# Patient Record
Sex: FEMALE | Race: WHITE | NOT HISPANIC OR LATINO | Employment: OTHER | URBAN - METROPOLITAN AREA
[De-identification: names, ages, dates, MRNs, and addresses within clinical notes are randomized per-mention and may not be internally consistent; named-entity substitution may affect disease eponyms.]

---

## 2019-02-25 ENCOUNTER — TRANSCRIBE ORDERS (OUTPATIENT)
Dept: REGISTRATION | Age: 74
End: 2019-02-25

## 2019-02-25 ENCOUNTER — APPOINTMENT (OUTPATIENT)
Dept: LAB | Age: 74
End: 2019-02-25
Attending: INTERNAL MEDICINE
Payer: MEDICARE

## 2019-02-25 DIAGNOSIS — E03.8 OTHER SPECIFIED HYPOTHYROIDISM: Primary | ICD-10-CM

## 2019-02-25 DIAGNOSIS — M81.0 AGE-RELATED OSTEOPOROSIS WITHOUT CURRENT PATHOLOGICAL FRACTURE: ICD-10-CM

## 2019-02-25 DIAGNOSIS — I10 ESSENTIAL (PRIMARY) HYPERTENSION: ICD-10-CM

## 2019-02-25 DIAGNOSIS — D50.9 IRON DEFICIENCY ANEMIA: ICD-10-CM

## 2019-02-25 DIAGNOSIS — E06.3 AUTOIMMUNE THYROIDITIS: ICD-10-CM

## 2019-02-25 DIAGNOSIS — E78.5 HYPERLIPIDEMIA: Primary | ICD-10-CM

## 2019-02-25 DIAGNOSIS — E03.9 HYPOTHYROIDISM: ICD-10-CM

## 2019-02-25 DIAGNOSIS — E78.5 HYPERLIPIDEMIA: ICD-10-CM

## 2019-02-25 DIAGNOSIS — E03.8 OTHER SPECIFIED HYPOTHYROIDISM: ICD-10-CM

## 2019-02-25 LAB
ALBUMIN SERPL-MCNC: 3.6 G/DL (ref 3.4–5)
ALP SERPL-CCNC: 54 IU/L (ref 35–126)
ALT SERPL-CCNC: 28 IU/L (ref 11–54)
ANION GAP SERPL CALC-SCNC: 10 MEQ/L (ref 3–15)
AST SERPL-CCNC: 30 IU/L (ref 15–41)
BASOPHILS # BLD: 0.06 K/UL (ref 0.01–0.1)
BASOPHILS NFR BLD: 0.9 %
BILIRUB SERPL-MCNC: 1.2 MG/DL (ref 0.3–1.2)
BUN SERPL-MCNC: 15 MG/DL (ref 8–20)
CALCIUM SERPL-MCNC: 9.4 MG/DL (ref 8.9–10.3)
CHLORIDE SERPL-SCNC: 101 MEQ/L (ref 98–109)
CHOLEST SERPL-MCNC: 216 MG/DL
CO2 SERPL-SCNC: 30 MEQ/L (ref 22–32)
CREAT SERPL-MCNC: 0.8 MG/DL
DIFFERENTIAL METHOD BLD: NORMAL
EOSINOPHIL # BLD: 0.12 K/UL (ref 0.04–0.36)
EOSINOPHIL NFR BLD: 1.9 %
ERYTHROCYTE [DISTWIDTH] IN BLOOD BY AUTOMATED COUNT: 13.2 % (ref 11.7–14.4)
FERRITIN SERPL-MCNC: 42 NG/ML (ref 11–250)
GFR SERPL CREATININE-BSD FRML MDRD: >60 ML/MIN/1.73M*2
GLUCOSE SERPL-MCNC: 82 MG/DL (ref 70–99)
HCT VFR BLDCO AUTO: 38.5 %
HDLC SERPL-MCNC: 61 MG/DL
HDLC SERPL: 3.5 {RATIO}
HGB BLD-MCNC: 12.6 G/DL
IMM GRANULOCYTES # BLD AUTO: 0.02 K/UL (ref 0–0.08)
IMM GRANULOCYTES NFR BLD AUTO: 0.3 %
IRON SATN MFR SERPL: 40 % (ref 15–45)
IRON SERPL-MCNC: 144 UG/DL (ref 35–150)
LDLC SERPL CALC-MCNC: 123 MG/DL
LYMPHOCYTES # BLD: 1.92 K/UL (ref 1.2–3.5)
LYMPHOCYTES NFR BLD: 29.7 %
MCH RBC QN AUTO: 30.4 PG (ref 28–33.2)
MCHC RBC AUTO-ENTMCNC: 32.7 G/DL (ref 32.2–35.5)
MCV RBC AUTO: 93 FL (ref 83–98)
MONOCYTES # BLD: 0.52 K/UL (ref 0.28–0.8)
MONOCYTES NFR BLD: 8 %
NEUTROPHILS # BLD: 3.83 K/UL (ref 1.7–7)
NEUTS SEG NFR BLD: 59.2 %
NONHDLC SERPL-MCNC: 155 MG/DL
NRBC BLD-RTO: 0 %
PDW BLD AUTO: 10.2 FL (ref 9.4–12.3)
PLATELET # BLD AUTO: 329 K/UL
POTASSIUM SERPL-SCNC: 3.6 MEQ/L (ref 3.6–5.1)
PROT SERPL-MCNC: 6.1 G/DL (ref 6–8.2)
RBC # BLD AUTO: 4.14 M/UL (ref 3.93–5.22)
SODIUM SERPL-SCNC: 141 MEQ/L (ref 136–144)
T4 FREE SERPL-MCNC: 1.7 NG/DL (ref 0.58–1.64)
TIBC SERPL-MCNC: 360 UG/DL (ref 270–460)
TRIGL SERPL-MCNC: 161 MG/DL (ref 30–149)
TSH SERPL DL<=0.05 MIU/L-ACNC: 1.67 MIU/L (ref 0.34–5.6)
UIBC SERPL-MCNC: 216 UG/DL (ref 180–360)
WBC # BLD AUTO: 6.47 K/UL

## 2019-02-25 PROCEDURE — 83540 ASSAY OF IRON: CPT

## 2019-02-25 PROCEDURE — 82728 ASSAY OF FERRITIN: CPT

## 2019-02-25 PROCEDURE — 85025 COMPLETE CBC W/AUTO DIFF WBC: CPT

## 2019-02-25 PROCEDURE — 80053 COMPREHEN METABOLIC PANEL: CPT

## 2019-02-25 PROCEDURE — 84439 ASSAY OF FREE THYROXINE: CPT

## 2019-02-25 PROCEDURE — 36415 COLL VENOUS BLD VENIPUNCTURE: CPT

## 2019-02-25 PROCEDURE — 84443 ASSAY THYROID STIM HORMONE: CPT

## 2019-02-25 PROCEDURE — 80061 LIPID PANEL: CPT

## 2019-03-19 ENCOUNTER — APPOINTMENT (OUTPATIENT)
Dept: LAB | Age: 74
End: 2019-03-19
Attending: INTERNAL MEDICINE
Payer: MEDICARE

## 2019-03-19 ENCOUNTER — TRANSCRIBE ORDERS (OUTPATIENT)
Dept: LAB | Age: 74
End: 2019-03-19

## 2019-03-19 DIAGNOSIS — D50.9 IRON DEFICIENCY ANEMIA: ICD-10-CM

## 2019-03-19 DIAGNOSIS — R53.83 OTHER FATIGUE: ICD-10-CM

## 2019-03-19 DIAGNOSIS — D64.9 ANEMIA: ICD-10-CM

## 2019-03-19 DIAGNOSIS — D50.9 IRON DEFICIENCY ANEMIA: Primary | ICD-10-CM

## 2019-03-19 LAB
BASOPHILS # BLD: 0.06 K/UL (ref 0.01–0.1)
BASOPHILS NFR BLD: 0.9 %
DIFFERENTIAL METHOD BLD: NORMAL
EOSINOPHIL # BLD: 0.15 K/UL (ref 0.04–0.36)
EOSINOPHIL NFR BLD: 2.1 %
ERYTHROCYTE [DISTWIDTH] IN BLOOD BY AUTOMATED COUNT: 14.1 % (ref 11.7–14.4)
FERRITIN SERPL-MCNC: 25 NG/ML (ref 11–250)
HCT VFR BLDCO AUTO: 37.6 %
HGB BLD-MCNC: 12.5 G/DL
IMM GRANULOCYTES # BLD AUTO: 0.04 K/UL (ref 0–0.08)
IMM GRANULOCYTES NFR BLD AUTO: 0.6 %
IRON SATN MFR SERPL: 33 % (ref 15–45)
IRON SERPL-MCNC: 119 UG/DL (ref 35–150)
LYMPHOCYTES # BLD: 1.97 K/UL (ref 1.2–3.5)
LYMPHOCYTES NFR BLD: 28 %
MCH RBC QN AUTO: 31.4 PG (ref 28–33.2)
MCHC RBC AUTO-ENTMCNC: 33.2 G/DL (ref 32.2–35.5)
MCV RBC AUTO: 94.5 FL (ref 83–98)
MONOCYTES # BLD: 0.45 K/UL (ref 0.28–0.8)
MONOCYTES NFR BLD: 6.4 %
NEUTROPHILS # BLD: 4.37 K/UL (ref 1.7–7)
NEUTS SEG NFR BLD: 62 %
NRBC BLD-RTO: 0 %
PDW BLD AUTO: 10.9 FL (ref 9.4–12.3)
PLATELET # BLD AUTO: 267 K/UL
RBC # BLD AUTO: 3.98 M/UL (ref 3.93–5.22)
RETICS #: 0.11 M/UL (ref 0–0.12)
RETICS/RBC NFR: 2.67 % (ref 0.6–2.8)
TIBC SERPL-MCNC: 358 UG/DL (ref 270–460)
UIBC SERPL-MCNC: 239 UG/DL (ref 180–360)
WBC # BLD AUTO: 7.04 K/UL

## 2019-03-19 PROCEDURE — 83550 IRON BINDING TEST: CPT

## 2019-03-19 PROCEDURE — 85025 COMPLETE CBC W/AUTO DIFF WBC: CPT

## 2019-03-19 PROCEDURE — 85045 AUTOMATED RETICULOCYTE COUNT: CPT

## 2019-03-19 PROCEDURE — 36415 COLL VENOUS BLD VENIPUNCTURE: CPT

## 2019-03-19 PROCEDURE — 82728 ASSAY OF FERRITIN: CPT

## 2021-06-22 ENCOUNTER — OFFICE VISIT (OUTPATIENT)
Dept: ENDOCRINOLOGY | Facility: CLINIC | Age: 76
End: 2021-06-22
Payer: MEDICARE

## 2021-06-22 VITALS
HEART RATE: 66 BPM | DIASTOLIC BLOOD PRESSURE: 98 MMHG | OXYGEN SATURATION: 99 % | BODY MASS INDEX: 26.05 KG/M2 | RESPIRATION RATE: 18 BRPM | WEIGHT: 147 LBS | SYSTOLIC BLOOD PRESSURE: 162 MMHG | HEIGHT: 63 IN

## 2021-06-22 DIAGNOSIS — M81.0 AGE-RELATED OSTEOPOROSIS WITHOUT CURRENT PATHOLOGICAL FRACTURE: Primary | ICD-10-CM

## 2021-06-22 DIAGNOSIS — E55.9 VITAMIN D DEFICIENCY: ICD-10-CM

## 2021-06-22 DIAGNOSIS — E06.3 HYPOTHYROIDISM DUE TO HASHIMOTO'S THYROIDITIS: ICD-10-CM

## 2021-06-22 PROCEDURE — 99205 OFFICE O/P NEW HI 60 MIN: CPT | Performed by: INTERNAL MEDICINE

## 2021-06-22 RX ORDER — ELECTROLYTES/DEXTROSE
SOLUTION, ORAL ORAL DAILY
COMMUNITY

## 2021-06-22 RX ORDER — VITAMIN E (DL,TOCOPHERYL ACET) 180 MG
CAPSULE ORAL
COMMUNITY

## 2021-06-22 RX ORDER — FLUTICASONE PROPIONATE 220 UG/1
AEROSOL, METERED RESPIRATORY (INHALATION)
COMMUNITY

## 2021-06-22 RX ORDER — DOCUSATE SODIUM 100 MG/1
100 CAPSULE, LIQUID FILLED ORAL 2 TIMES DAILY
COMMUNITY

## 2021-06-22 RX ORDER — LEVOTHYROXINE SODIUM 100 UG/1
100 TABLET ORAL
COMMUNITY

## 2021-06-22 RX ORDER — LOSARTAN POTASSIUM AND HYDROCHLOROTHIAZIDE 25; 100 MG/1; MG/1
1 TABLET ORAL DAILY
COMMUNITY
End: 2021-06-22

## 2021-06-22 RX ORDER — FAMOTIDINE 10 MG/1
10 TABLET ORAL NIGHTLY PRN
COMMUNITY

## 2021-06-22 RX ORDER — MONTELUKAST SODIUM 10 MG/1
TABLET ORAL
COMMUNITY

## 2021-06-22 RX ORDER — AA/PROT/LYSINE/METHIO/VIT C/B6 50-12.5 MG
TABLET ORAL
COMMUNITY

## 2021-06-22 RX ORDER — ACETAMINOPHEN 500 MG
2000 TABLET ORAL DAILY
Qty: 90 CAPSULE | Refills: 3 | COMMUNITY
Start: 2021-06-22

## 2021-06-22 RX ORDER — ALBUTEROL SULFATE 90 UG/1
INHALANT RESPIRATORY (INHALATION)
COMMUNITY

## 2021-06-22 RX ORDER — PRAVASTATIN SODIUM 40 MG/1
40 TABLET ORAL
COMMUNITY
Start: 2021-03-28

## 2021-06-22 RX ORDER — LOSARTAN POTASSIUM 50 MG/1
100 TABLET ORAL DAILY
COMMUNITY

## 2021-06-22 RX ORDER — SODIUM FLUORIDE 6.1 MG/ML
GEL, DENTIFRICE DENTAL
COMMUNITY
Start: 2021-04-09

## 2021-06-22 RX ORDER — AMLODIPINE BESYLATE 5 MG/1
2.5 TABLET ORAL DAILY
COMMUNITY

## 2021-06-22 RX ORDER — DEXLANSOPRAZOLE 60 MG/1
CAPSULE, DELAYED RELEASE ORAL
COMMUNITY

## 2021-06-22 RX ORDER — CHOLECALCIFEROL (VITAMIN D3) 100000/G
2000 POWDER (GRAM) MISCELLANEOUS
COMMUNITY
End: 2021-06-22

## 2021-06-22 ASSESSMENT — ENCOUNTER SYMPTOMS
HEMATOLOGIC/LYMPHATIC NEGATIVE: 1
ENDOCRINE COMMENTS: AS PER HPI
EYES NEGATIVE: 1
GASTROINTESTINAL NEGATIVE: 1
PSYCHIATRIC NEGATIVE: 1
ALLERGIC/IMMUNOLOGIC NEGATIVE: 1
CARDIOVASCULAR NEGATIVE: 1
CONSTITUTIONAL NEGATIVE: 1
MUSCULOSKELETAL NEGATIVE: 1
RESPIRATORY NEGATIVE: 1
NEUROLOGICAL NEGATIVE: 1

## 2021-06-22 NOTE — Clinical Note
Pt is due for prolia in June. Going for labs now, can you please work on insurance coverage, so she can be scheduled to receive prolia as long as labs are good next Tuesday? Thanks.
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
## 2021-06-22 NOTE — ASSESSMENT & PLAN NOTE
-reviewed DXA scans/ prior records  -pt's last DXA 6/2020; stable Tscore; LS -1.6, hip -1.6, fem neck -3.3; due for DXA 6/2022  -previous OP w/u: PTH normal, 24 hour urine calcium not elevated, celiac screen negative. Overtreated for hypothyroidism in the past. No longer on raloxifene (not for osteoporosis). On denosumab since 6/2017. Previously completed fosmax [id number]  -at this time, due for prolia- last dose 12/2020- will check labs now; as long as labs wnl; proceed with prolia  -reviewed DXA, risk factors for OP, reviewed definition of OP, indication for treatment of OP, lifestyle tx options including calcium intake, vitamin D, weight bearing activity, and avoiding falls, pharmacologic therapy options, associated risks/benefits/ side effects of OP treatment options, treatment duration, failure of therapy, monitoring while on OP medications  -- We discussed that if on prolia, the medication should be dosed every 6 months, and delaying the medication administration increases the risk of bone loss and fracture.  - This medication carries the adverse risk of osteonecrosis of the jaw and atypical femoral fractures. Patient will need to continue with prolia long term.  - Denosumab suppresses bone remodeling, but there are few data on the long-term consequences with regard to adverse outcomes, such as ONJ, atypical fractures, and delayed fracture healing. ONJ and atypical fractures have been reported in patients taking denosumab for osteoporosis  -cont ca/ vitamin D/ weight bearing exercise/ avoid falls  -Return in about 6 months (around 12/22/2021).with labs

## 2021-06-22 NOTE — ASSESSMENT & PLAN NOTE
-clinically euthyroid/ check TSH now  -cont synthroid 100mcg - 1 tablet 6 days/week, 1.5 tablets 1 day/week  -pending TSH; will send synthroid refills  Return in about 6 months (around 12/22/2021).
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
## 2021-06-23 ENCOUNTER — TELEPHONE (OUTPATIENT)
Dept: ENDOCRINOLOGY | Facility: CLINIC | Age: 76
End: 2021-06-23

## 2021-06-23 NOTE — TELEPHONE ENCOUNTER
----- Message from Radha Snyder MD sent at 6/22/2021 11:43 AM EDT -----  Pt is due for prolia in June. Going for labs now, can you please work on insurance coverage, so she can be scheduled to receive prolia as long as labs are good next Tuesday? Thanks.

## 2021-06-25 LAB
25(OH)D3 SERPL-MCNC: 41 NG/ML (ref 30–100)
ALBUMIN SERPL-MCNC: 4 G/DL (ref 3.6–5.1)
ALBUMIN/GLOB SERPL: 1.9 (CALC) (ref 1–2.5)
ALP SERPL-CCNC: 57 U/L (ref 37–153)
ALT SERPL-CCNC: 15 U/L (ref 6–29)
AST SERPL-CCNC: 17 U/L (ref 10–35)
BILIRUB SERPL-MCNC: 1 MG/DL (ref 0.2–1.2)
BUN SERPL-MCNC: 15 MG/DL (ref 7–25)
BUN/CREAT SERPL: NORMAL (CALC) (ref 6–22)
CALCIUM SERPL-MCNC: 9.1 MG/DL (ref 8.6–10.4)
CHLORIDE SERPL-SCNC: 107 MMOL/L (ref 98–110)
CO2 SERPL-SCNC: 28 MMOL/L (ref 20–32)
CREAT SERPL-MCNC: 0.75 MG/DL (ref 0.6–0.93)
GLOBULIN SER CALC-MCNC: 2.1 G/DL (CALC) (ref 1.9–3.7)
GLUCOSE SERPL-MCNC: 83 MG/DL (ref 65–139)
POTASSIUM SERPL-SCNC: 4.1 MMOL/L (ref 3.5–5.3)
PROT SERPL-MCNC: 6.1 G/DL (ref 6.1–8.1)
QUEST EGFR AFRICAN AMERICAN: 90 ML/MIN/1.73M2
QUEST EGFR NON-AFR. AMERICAN: 78 ML/MIN/1.73M2
SODIUM SERPL-SCNC: 142 MMOL/L (ref 135–146)
TSH SERPL-ACNC: 2.3 MIU/L (ref 0.4–4.5)

## 2021-06-25 RX ORDER — LEVOTHYROXINE SODIUM 100 UG/1
100 TABLET ORAL
Qty: 90 TABLET | Refills: 1 | Status: CANCELLED | OUTPATIENT
Start: 2021-06-25 | End: 2021-09-23

## 2021-06-25 NOTE — TELEPHONE ENCOUNTER
Prolia benefit investigation is complete and covered at 100% for buy and bill.  I will rerun for RX coverage and see how that turns out.

## 2021-06-25 NOTE — TELEPHONE ENCOUNTER
----- Message from Radha Snyder MD sent at 6/25/2021  3:10 PM EDT -----  Please update with pharmacy. Thanks.

## 2021-06-25 NOTE — TELEPHONE ENCOUNTER
----- Message from Radha Snyder MD sent at 6/25/2021  2:43 PM EDT -----  Regarding: prolia injections at [facility]  Hi there:     This is a new [facility] pt who is due for prolia. Are we able to get her squared away for Prolia injection at [facility]?     Jeana- can you please get the ball rolling for her insurance? I'm assuming her picking up from pharmacy and bringing to office would be best.     Verito- we'll have to schedule her for a diagnostic visit, so she can get the injection.     Elizabeth- any issues on your end?     Thanks,   TK   ----- Message -----  From: Interface, [facility] Lab Results In  Sent: 6/25/2021   2:45 AM EDT  To: Radha Snyder MD

## 2021-06-25 NOTE — TELEPHONE ENCOUNTER
Pt called office to s/u an appt in [facility] for prolia inject.  Informed, prolia is not given in [facility] only in [facility].  Nurse spoke w/ PSR Jeana - who stated that she submitted a form into pt's insurance about the prolia.  When she hears approval from that she will call pt to s/u appt for prolia injection.  Pt stated understanding

## 2021-06-29 NOTE — TELEPHONE ENCOUNTER
----- Message from Radha Snyder MD sent at 6/29/2021  8:15 AM EDT -----  Do we have a pharmacy for this patient yet?

## 2021-06-29 NOTE — TELEPHONE ENCOUNTER
Called pt again this morning to obtain pharmacy info to get rx coverage to submit to [employer] for benefit investigation for Prolia through ph benefits. Pt states that she is not providing pharmacy info as she was told she could have Prolia at \[facility]\"" this week with no issue.She was very frustrated that the Prolia was not already approved and I informed the pt that since she is having this done at \[facility]\"" we must go through her ph benefits, not Medicare.  Pt states she does not want to go forward treating with this practice.  Told pt I would let Dr Snyder know.

## 2021-12-07 ENCOUNTER — TELEPHONE (OUTPATIENT)
Dept: ENDOCRINOLOGY | Facility: CLINIC | Age: 76
End: 2021-12-07
Payer: MEDICARE

## 2021-12-07 NOTE — TELEPHONE ENCOUNTER
Spoke with pt to remind of upcoming appt.  And, she stated she feels like she had been deceived.  She signed up to come here for Prolia, then was told she would have to go elsewhere for Prolia, so she is cancelling.

## 2024-05-03 ENCOUNTER — DOCUMENTATION (OUTPATIENT)
Dept: GASTROENTEROLOGY | Facility: CLINIC | Age: 79
End: 2024-05-03

## 2024-05-03 RX ORDER — LEVOCETIRIZINE DIHYDROCHLORIDE 5 MG/1
5 TABLET, FILM COATED ORAL EVERY EVENING
COMMUNITY

## 2024-05-03 RX ORDER — FLUTICASONE PROPIONATE 50 MCG
1 SPRAY, SUSPENSION (ML) NASAL DAILY
COMMUNITY

## 2024-05-03 RX ORDER — LIDOCAINE HYDROCHLORIDE 20 MG/ML
1 JELLY TOPICAL DAILY PRN
COMMUNITY

## 2024-05-03 RX ORDER — DEXLANSOPRAZOLE 60 MG/1
60 CAPSULE, DELAYED RELEASE ORAL DAILY
COMMUNITY

## 2024-05-03 RX ORDER — FLUTICASONE PROPIONATE 220 UG/1
2 AEROSOL, METERED RESPIRATORY (INHALATION) 2 TIMES DAILY
COMMUNITY

## 2024-05-03 RX ORDER — EPINEPHRINE 0.3 MG/.3ML
0.3 INJECTION SUBCUTANEOUS ONCE
COMMUNITY

## 2024-05-03 RX ORDER — LEVOTHYROXINE SODIUM 0.1 MG/1
100 TABLET ORAL DAILY
COMMUNITY

## 2024-05-03 RX ORDER — MONTELUKAST SODIUM 10 MG/1
10 TABLET ORAL
COMMUNITY

## 2024-05-03 RX ORDER — TRIAMCINOLONE ACETONIDE 1 MG/G
OINTMENT TOPICAL 2 TIMES DAILY
COMMUNITY

## 2024-05-03 RX ORDER — DENOSUMAB 60 MG/ML
60 INJECTION SUBCUTANEOUS ONCE
COMMUNITY

## 2024-05-03 RX ORDER — AMLODIPINE BESYLATE 5 MG/1
5 TABLET ORAL DAILY
COMMUNITY

## 2024-05-03 RX ORDER — PRAVASTATIN SODIUM 80 MG/1
80 TABLET ORAL DAILY
COMMUNITY

## 2024-05-03 RX ORDER — HYDROXYZINE HYDROCHLORIDE 25 MG/1
25 TABLET, FILM COATED ORAL EVERY 6 HOURS PRN
COMMUNITY

## 2024-05-03 RX ORDER — LOSARTAN POTASSIUM 100 MG/1
25 TABLET ORAL DAILY
COMMUNITY

## 2024-05-03 RX ORDER — ALBUTEROL SULFATE 90 UG/1
2 AEROSOL, METERED RESPIRATORY (INHALATION) EVERY 6 HOURS PRN
COMMUNITY

## 2024-05-03 RX ORDER — SODIUM FLUORIDE AND POTASSIUM NITRATE 5.8; 57.5 MG/ML; MG/ML
GEL, DENTIFRICE DENTAL
COMMUNITY

## 2024-05-03 RX ORDER — VIT C/B6/B5/MAGNESIUM/HERB 173 50-5-6-5MG
CAPSULE ORAL
COMMUNITY

## 2024-05-03 RX ORDER — FAMOTIDINE 20 MG/1
20 TABLET, FILM COATED ORAL 2 TIMES DAILY
COMMUNITY

## 2024-05-13 ENCOUNTER — CONSULT (OUTPATIENT)
Age: 79
End: 2024-05-13
Payer: MEDICARE

## 2024-05-13 VITALS
HEIGHT: 62 IN | BODY MASS INDEX: 27.75 KG/M2 | WEIGHT: 150.8 LBS | SYSTOLIC BLOOD PRESSURE: 118 MMHG | DIASTOLIC BLOOD PRESSURE: 74 MMHG

## 2024-05-13 DIAGNOSIS — K22.70 BARRETT'S ESOPHAGUS WITHOUT DYSPLASIA: ICD-10-CM

## 2024-05-13 DIAGNOSIS — K21.9 GASTROESOPHAGEAL REFLUX DISEASE WITHOUT ESOPHAGITIS: Primary | ICD-10-CM

## 2024-05-13 PROCEDURE — 99203 OFFICE O/P NEW LOW 30 MIN: CPT | Performed by: INTERNAL MEDICINE

## 2024-05-13 RX ORDER — LOSARTAN POTASSIUM AND HYDROCHLOROTHIAZIDE 12.5; 1 MG/1; MG/1
1 TABLET ORAL DAILY
COMMUNITY

## 2024-05-13 NOTE — PATIENT INSTRUCTIONS
Discontinue Dexilant.  Start Pepcid 20 mg at bedtime.  Slowly liberalize diet.  If have significant breakthrough symptoms let me know.  Follow-up office visit 3 months

## 2024-05-13 NOTE — PROGRESS NOTES
[facility] - Outpatient Consultation  María Elena Morton 79 y.o. female MRN: [id number]  Encounter: [id number]          ASSESSMENT AND PLAN:      1. Gastroesophageal reflux disease without esophagitis  2. Morocho's esophagus without dysplasia  Diagnosed about 10 years on EGD.  Unclear why this was performed although it looks like she has a history of iron deficiency anemia so perhaps it was related to that.  Has been on Dexilant since then.  Most recent EGD 2 months ago with only a irregular Z-line.  -Long discussion with patient about the pathophysiology of GERD as well as ramifications of intestinal metaplasia.  Discussed the changing thoughts about short segment Morocho's and irregular Z-line and its association with esophageal cancer  -Will stop Dexilant  -Take Pepcid 20 mg bedtime consistently  -Slowly liberalize the diet  -If she has significant symptoms off the Dexilant would first increase the Pepcid to 40 mg at bedtime  -Will try to get old records from previous endoscopies as well as the pathology of the biopsies from March 2024    ______________________________________________________________________    HPI: This is a 79-year-old woman seen in the office for evaluation of her GERD and Morocho's esophagus.  She reports around 10 years ago she underwent an upper endoscopy for unclear reasons and was told that she had Morocho's.  She denies any heartburn or indigestion.  She denies any dysphagia, odynophagia, early satiety.  She was told she may have silent reflux.  She was started on Dexilant.  She has been taking that since then.  Her most recent endoscopy for Morocho's surveillance was in March of this year.  Reviewing the report there was a small irregular Z-line that was biopsied but no definitive 3 cm of intestinal metaplasia.  I do not have the results of the pathology.  She has become increasingly apprehensive about the PPI secondary to reading stuff online and  because of this her Dexilant was decreased to every other day.  She has been taking Pepcid 1-2 times a day to supplement this.  She continues to have no GI symptoms although she significantly limits her diet and avoids alcohol, red sauce, and tea.  Her weight is stable.  Her bowels are normal.      REVIEW OF SYSTEMS:    CONSTITUTIONAL: Denies any fever, chills, rigors, and weight loss.  HEENT: No earache or tinnitus. Denies hearing loss or visual disturbances.  CARDIOVASCULAR: No chest pain or palpitations.   RESPIRATORY: Denies any cough, hemoptysis, shortness of breath or dyspnea on exertion.  GASTROINTESTINAL: As noted in the History of Present Illness.   GENITOURINARY: No problems with urination. Denies any hematuria or dysuria.  NEUROLOGIC: No dizziness or vertigo, denies headaches.   MUSCULOSKELETAL: Denies any muscle or joint pain.   SKIN: Denies skin rashes or itching.   ENDOCRINE: Denies excessive thirst. Denies intolerance to heat or cold.  PSYCHOSOCIAL: Denies depression or anxiety. Denies any recent memory loss.       Historical Information   Past Medical History:   Diagnosis Date    Anemia     Asthma     Cataracts, bilateral     Edema, lower extremity     GERD (gastroesophageal reflux disease)     Hyperlipidemia     Hypertension     Hypothyroid     Idiopathic urticaria     Iron deficiency     Lumbar disc disorder     Migraine headache without aura     Osteoporosis      Past Surgical History:   Procedure Laterality Date    BREAST CYST EXCISION      COLONOSCOPY      UPPER GASTROINTESTINAL ENDOSCOPY      WISDOM TOOTH EXTRACTION       Social History   Social History     Substance and Sexual Activity   Alcohol Use Yes    Comment: very rarely (once every 5 years or so)     Social History     Substance and Sexual Activity   Drug Use Never     Social History     Tobacco Use   Smoking Status Never   Smokeless Tobacco Never     Family History   Problem Relation Age of Onset    Osteoporosis Mother     Hypertension  "Father     Colon cancer Neg Hx     Colon polyps Neg Hx        Meds/Allergies       Current Outpatient Medications:     albuterol (PROVENTIL HFA,VENTOLIN HFA) 90 mcg/act inhaler    amLODIPine (NORVASC) 5 mg tablet    Cholecalciferol (VITAMIN D3 PO)    Coenzyme Q10 (COQ10 PO)    denosumab (Prolia) 60 mg/mL    dexlansoprazole (DEXILANT) 60 MG capsule    Diclofenac Sodium (VOLTAREN) 1 %    famotidine (PEPCID) 20 mg tablet    fluticasone (FLONASE) 50 mcg/act nasal spray    fluticasone (FLOVENT HFA) 220 mcg/act inhaler    hydrOXYzine HCL (ATARAX) 25 mg tablet    levocetirizine (XYZAL) 5 MG tablet    levothyroxine (Synthroid) 100 mcg tablet    losartan-hydrochlorothiazide (HYZAAR) 100-12.5 MG per tablet    montelukast (SINGULAIR) 10 mg tablet    pravastatin (PRAVACHOL) 80 mg tablet    EPINEPHrine (EpiPen 2-Fernandez) 0.3 mg/0.3 mL SOAJ    lidocaine (URO-JET, GLYDO) 2 % urethral/mucosal gel    losartan (COZAAR) 100 MG tablet    Sod Fluoride-Potassium Nitrate (PreviDent 5000 Enamel Protect) 1.1-5 % GEL    Sodium Fluoride (PREVIDENT 5000 DRY MOUTH DT)    triamcinolone (KENALOG) 0.1 % ointment    Turmeric 500 MG CAPS    Allergies   Allergen Reactions    Codeine Nausea Only and Vomiting    Amoxicillin-Pot Clavulanate Other (See Comments)    Celecoxib Diarrhea    Shellfish Allergy - Food Allergy Hives           Objective     Blood pressure 118/74, height 5' 1.5\" (1.562 m), weight 68.4 kg (150 lb 12.8 oz). Body mass index is 28.03 kg/m².        PHYSICAL EXAM:      General Appearance:   Alert, cooperative, no distress   HEENT:   Normocephalic, atraumatic, anicteric.     Neck:  Supple, symmetrical, trachea midline   Lungs:   Clear to auscultation bilaterally; no rales, rhonchi or wheezing; respirations unlabored    Heart::   Regular rate and rhythm; no murmur, rub, or gallop.   Abdomen:   Soft, non-tender, non-distended; normal bowel sounds; no masses, no organomegaly    Genitalia:   Deferred    Rectal:   Deferred    Extremities:  No " cyanosis, clubbing or edema    Pulses:  2+ and symmetric    Skin:  No jaundice, rashes, or lesions    Lymph nodes:  No palpable cervical lymphadenopathy        Lab Results:   No visits with results within 1 Day(s) from this visit.   Latest known visit with results is:   No results found for any previous visit.         Radiology Results:   No results found.

## 2024-08-28 ENCOUNTER — OFFICE VISIT (OUTPATIENT)
Dept: GASTROENTEROLOGY | Facility: CLINIC | Age: 79
End: 2024-08-28
Payer: MEDICARE

## 2024-08-28 ENCOUNTER — TELEPHONE (OUTPATIENT)
Dept: GASTROENTEROLOGY | Facility: CLINIC | Age: 79
End: 2024-08-28

## 2024-08-28 VITALS
HEIGHT: 62 IN | BODY MASS INDEX: 27.6 KG/M2 | WEIGHT: 150 LBS | DIASTOLIC BLOOD PRESSURE: 80 MMHG | SYSTOLIC BLOOD PRESSURE: 122 MMHG

## 2024-08-28 DIAGNOSIS — K21.9 GASTROESOPHAGEAL REFLUX DISEASE WITHOUT ESOPHAGITIS: Primary | ICD-10-CM

## 2024-08-28 DIAGNOSIS — K22.70 BARRETT'S ESOPHAGUS WITHOUT DYSPLASIA: ICD-10-CM

## 2024-08-28 PROBLEM — Z12.11 SCREENING FOR COLON CANCER: Status: ACTIVE | Noted: 2024-08-28

## 2024-08-28 PROCEDURE — 99213 OFFICE O/P EST LOW 20 MIN: CPT | Performed by: INTERNAL MEDICINE

## 2024-08-28 PROCEDURE — G2211 COMPLEX E/M VISIT ADD ON: HCPCS | Performed by: INTERNAL MEDICINE

## 2024-08-28 NOTE — TELEPHONE ENCOUNTER
----- Message from Yovany Blackmon MD sent at 8/28/2024  1:47 PM EDT -----  Can we reach out to patient's PCP to get her last colonoscopy report.  It was not done by me and she does not remember when it was done

## 2024-08-28 NOTE — PROGRESS NOTES
[facility] - Outpatient Follow-up Note  María Elena Morton 79 y.o. female MRN: [id number]  Encounter: [id number]    ASSESSMENT AND PLAN:      1. Gastroesophageal reflux disease without esophagitis  Doing well from reflux standpoint since we transition from Dexilant to Pepcid  -Continue Pepcid 20 mg at bedtime    2. Morocho's esophagus without dysplasia  Last EGD March 2024.  Only irregular Z-line  -No plans to repeat EGD secondary to age and minimal amount of intestinal metaplasia    Medication 1 year  Has had colonoscopy before.  Does not remember when  -Will reach out to PCP to get old colonoscopy report      Follow up appointment: 1 year    _______________________      Chief Complaint   Patient presents with    Follow up-GERD     Wanted to let you know she has unexplained syncope       HPI:   Patient is a 79 y.o. female with a significant PMH of GERD presenting for follow up.  From a GI standpoint she is doing well.  She was concerned about electrolyte abnormalities and PPIs so when I last saw her we transitioned her from Dexilant to Pepcid.  She currently taking Pepcid 20 mg at bedtime.  This has been controlling her reflux.  She denies any heartburn or indigestion.  Denies dysphagia, odynophagia, or early satiety.  She does admit that she occasionally will take her 's Pepcid AC.  She reports that she did drinking tea again when she had not before and is not having any problems    Historical Information   Past Medical History:   Diagnosis Date    Anemia     Asthma     Cataracts, bilateral     Edema, lower extremity     GERD (gastroesophageal reflux disease)     Hyperlipidemia     Hypertension     Hypothyroid     Idiopathic urticaria     Iron deficiency     Lumbar disc disorder     Migraine headache without aura     Osteoporosis      Past Surgical History:   Procedure Laterality Date    BREAST CYST EXCISION      COLONOSCOPY      UPPER GASTROINTESTINAL ENDOSCOPY      WISDOM  "TOOTH EXTRACTION       Social History     Substance and Sexual Activity   Alcohol Use Yes    Comment: very rarely (once every 5 years or so)     Social History     Substance and Sexual Activity   Drug Use Never     Social History     Tobacco Use   Smoking Status Never   Smokeless Tobacco Never     Family History   Problem Relation Age of Onset    Osteoporosis Mother     Hypertension Father     Colon cancer Neg Hx     Colon polyps Neg Hx          Current Outpatient Medications:     albuterol (PROVENTIL HFA,VENTOLIN HFA) 90 mcg/act inhaler    amLODIPine (NORVASC) 5 mg tablet    Cholecalciferol (VITAMIN D3 PO)    Coenzyme Q10 (COQ10 PO)    denosumab (Prolia) 60 mg/mL    Diclofenac Sodium (VOLTAREN) 1 %    famotidine (PEPCID) 20 mg tablet    fluticasone (FLONASE) 50 mcg/act nasal spray    fluticasone (FLOVENT HFA) 220 mcg/act inhaler    hydrOXYzine HCL (ATARAX) 25 mg tablet    levocetirizine (XYZAL) 5 MG tablet    levothyroxine (Synthroid) 100 mcg tablet    losartan (COZAAR) 100 MG tablet    montelukast (SINGULAIR) 10 mg tablet    pravastatin (PRAVACHOL) 80 mg tablet  Allergies   Allergen Reactions    Codeine Nausea Only and Vomiting    Amoxicillin-Pot Clavulanate Other (See Comments)    Celecoxib Diarrhea    Shellfish Allergy - Food Allergy Hives     Reviewed medications and allergies and updated as indicated    PHYSICAL EXAM:    Blood pressure 122/80, height 5' 1.5\" (1.562 m), weight 68 kg (150 lb). Body mass index is 27.88 kg/m².  General Appearance: NAD, cooperative, alert  Eyes: Anicteric chest: Clear to auscultation  Heart: Regular rate and rhythm    GI:  Soft, non-tender, non-distended; normal bowel sounds; no masses, no organomegaly   Rectal: Deferred  Musculoskeletal: No edema.  Skin:  No jaundice    Lab Results:     Lab Results   Component Value Date    K 4.1 08/21/2024     01/27/2021    CO2 24.7 08/21/2024    BUN 19 08/21/2024    CREATININE 0.78 08/21/2024    CALCIUM 9.4 08/21/2024    AST 17 08/21/2024 "    AST 15 01/31/2024    AST 19 08/08/2023    ALT 13 08/21/2024    ALT 15 01/31/2024    ALT 16 08/08/2023    ALKPHOS 68 08/21/2024    ALKPHOS 59 01/31/2024    ALKPHOS 62 08/08/2023    EGFR 77.9 08/21/2024    EGFR 71.43 08/21/2024     Lab Results   Component Value Date    FERRITIN 21 06/18/2024       Radiology Results:   No results found.

## 2024-08-28 NOTE — PATIENT INSTRUCTIONS
Continue the Pepcid at bedtime.  Continue to watch her diet but I am okay with you trying to liberalize it as you are doing.  Follow-up office visit 1 year